# Patient Record
Sex: FEMALE | Race: ASIAN
[De-identification: names, ages, dates, MRNs, and addresses within clinical notes are randomized per-mention and may not be internally consistent; named-entity substitution may affect disease eponyms.]

---

## 2019-12-18 ENCOUNTER — APPOINTMENT (OUTPATIENT)
Dept: ORTHOPEDIC SURGERY | Facility: CLINIC | Age: 64
End: 2019-12-18

## 2020-10-08 ENCOUNTER — APPOINTMENT (OUTPATIENT)
Dept: ORTHOPEDIC SURGERY | Facility: CLINIC | Age: 65
End: 2020-10-08
Payer: COMMERCIAL

## 2020-10-08 PROBLEM — Z00.00 ENCOUNTER FOR PREVENTIVE HEALTH EXAMINATION: Status: ACTIVE | Noted: 2020-10-08

## 2020-10-08 PROCEDURE — 99204 OFFICE O/P NEW MOD 45 MIN: CPT | Mod: 25

## 2020-10-08 PROCEDURE — 20610 DRAIN/INJ JOINT/BURSA W/O US: CPT | Mod: RT

## 2020-10-08 PROCEDURE — 73562 X-RAY EXAM OF KNEE 3: CPT | Mod: RT

## 2020-10-08 NOTE — DISCUSSION/SUMMARY
[de-identified] : I discussed the underlying pathophysiology of the patient's condition in great detail with the patient. I went over the patient's x-rays with them in great detail. The extent of the patient’s arthritis was discussed in great detail with them. We discussed the use of ice, Tylenol and anti-inflammatories to relieve pain. She needs to avoid high-impact activities such as running and jumping. I recommend alternative activities such as riding a stationary bike on low tension, or the elliptical. She should avoid deep squatting and kneeling. Viscosupplementation was discussed as a solution to the patient's symptoms, but her insurance company does not cover these injections. Therefore, if she would like this treatment modality she would need to pay herself.\par The patient elected to receive a cortisone injection into her right knee today, and tolerated it well. I instructed the patient on ROM exercises, and told them to take it easy. The use of ice and rest was reviewed with the patient. The patient may resume activities tomorrow. If she does not have significant relief with this injection then a right knee MRI may be considered. If she does have relief then we will consider an  brace. All of her questions were answered. She understands and consents to the plan. \par \par FU 1 month.\par after a right knee cortisone injection today (10/08/2020).

## 2020-10-08 NOTE — HISTORY OF PRESENT ILLNESS
[de-identified] : 65 year old female presents complaining of right knee pain. She has been having intermittent pain for years that has become more frequent over the past few months. She denies any recent injuries or trauma. She saw Dr. Mc for this issue in February 2015. An MRI of her knee was done at that time that showed degenerative changes of the medial compartment and meniscal degradation. She has pain with standing and walking for long periods of time. Stairs are uncomfortable. 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 She has noticed some swelling but denies buckling. Her pain does wake her from sleep occasionally. She has not had recent treatment. The patient denies taking any medication to manage her pain.

## 2020-10-08 NOTE — ADDENDUM
[FreeTextEntry1] : I, Amadou Coulter, acted solely as a scribe for Dr. Bola Mc on this date 10/08/2020.\par All medical record entries made by the Scribe were at my, Dr. Bola Mc, direction and personally dictated by me on 10/08/2020. I have reviewed the chart and agree that the record accurately reflects my personal performance of the history, physical exam, assessment and plan. I have also personally directed, reviewed, and agreed with the chart.

## 2020-11-05 ENCOUNTER — APPOINTMENT (OUTPATIENT)
Dept: ORTHOPEDIC SURGERY | Facility: CLINIC | Age: 65
End: 2020-11-05
Payer: COMMERCIAL

## 2020-11-05 DIAGNOSIS — M23.91 UNSPECIFIED INTERNAL DERANGEMENT OF RIGHT KNEE: ICD-10-CM

## 2020-11-05 PROCEDURE — 99072 ADDL SUPL MATRL&STAF TM PHE: CPT

## 2020-11-05 PROCEDURE — 99214 OFFICE O/P EST MOD 30 MIN: CPT

## 2020-12-06 ENCOUNTER — RESULT REVIEW (OUTPATIENT)
Age: 65
End: 2020-12-06

## 2020-12-06 ENCOUNTER — APPOINTMENT (OUTPATIENT)
Dept: MRI IMAGING | Facility: IMAGING CENTER | Age: 65
End: 2020-12-06

## 2020-12-06 ENCOUNTER — OUTPATIENT (OUTPATIENT)
Dept: OUTPATIENT SERVICES | Facility: HOSPITAL | Age: 65
LOS: 1 days | End: 2020-12-06
Payer: COMMERCIAL

## 2020-12-06 DIAGNOSIS — M23.91 UNSPECIFIED INTERNAL DERANGEMENT OF RIGHT KNEE: ICD-10-CM

## 2020-12-06 DIAGNOSIS — M17.11 UNILATERAL PRIMARY OSTEOARTHRITIS, RIGHT KNEE: ICD-10-CM

## 2020-12-06 PROCEDURE — 73721 MRI JNT OF LWR EXTRE W/O DYE: CPT | Mod: 26,RT

## 2020-12-06 PROCEDURE — 73721 MRI JNT OF LWR EXTRE W/O DYE: CPT

## 2020-12-19 ENCOUNTER — TRANSCRIPTION ENCOUNTER (OUTPATIENT)
Age: 65
End: 2020-12-19

## 2020-12-21 ENCOUNTER — APPOINTMENT (OUTPATIENT)
Dept: ORTHOPEDIC SURGERY | Facility: CLINIC | Age: 65
End: 2020-12-21

## 2021-03-04 ENCOUNTER — RESULT REVIEW (OUTPATIENT)
Age: 66
End: 2021-03-04

## 2022-06-04 NOTE — PHYSICAL EXAM
Assumed care:  Marleen Samano is awake, alert and oriented x 3, skin warm and dry, in NAD.  Patient CO burning, pressure, and frequent urination.  States that she has been treated twice in the last month for UTIs.  States that she finished her medication both times.    Patient identifiers for Marleen Samano checked and correct.  LOC:  Marleen Samano is awake, alert, and aware of environment with an appropriate affect. She is oriented x 3 and speaking appropriately.  APPEARANCE:  She is resting comfortably and in no acute distress. She is clean and well groomed, patient's clothing is properly fastened.  SKIN:  The skin is warm and dry. She has normal skin turgor and moist mucus membranes. Skin is intact; no bruising or breakdown noted.  MUSCULOSKELETAL:  She is moving all extremities well, no obvious deformities noted. Pulses intact.   RESPIRATORY:  Airway is open and patent. Respirations are spontaneous and non-labored with normal effort and rate.  CARDIAC:  She has a normal rate and rhythm. No peripheral edema noted. Capillary refill < 3 seconds.  ABDOMEN:  No distention noted.  Soft and non-tender upon palpation.  NEUROLOGICAL:  PERRL. Facial expression is symmetrical. Hand grasps are equal bilaterally. Normal sensation in all extremities when touched with finger.  Allergies reported:    Review of patient's allergies indicates:   Allergen Reactions    Adhesive tape-silicones Rash     Blisters after on for several hours    Augmentin [amoxicillin-pot clavulanate]      thrush    Ciprofloxacin      thrush    Morphine Itching and Hives     OTHER NOTES:    
[de-identified] : Constitutional\par o Appearance : well-nourished, well developed, alert, in no acute distress \par Head and Face\par o Head :\par ¦ Inspection : atraumatic, normocephalic\par o Face :\par ¦ Inspection : no visible rash or discoloration\par Respiratory\par o Respiratory Effort: breathing unlabored \par Neurologic\par o Mental Status Examination :\par ¦ Orientation : grossly oriented to person, place and time\par Psychiatric\par o Mood and Affect: mood normal, affect appropriate \par \par Right Lower Extremity\par o Buttock : no tenderness, swelling or deformities \par o Right Hip :\par ¦ Inspection/Palpation : no tenderness, swelling or deformities\par ¦ Range of Motion : full and painless in all planes, no crepitance\par ¦ Stability : joint stability intact\par ¦ Strength : extension, flexion, adduction, abduction, internal rotation and external rotation 5/5 \par \par o Right Knee :\par ¦ Inspection/Palpation : medial compartment tenderness to palpation, mild swelling\par ¦ Range of Motion : active flexion and extension full, pain with full flexion, no crepitance\par ¦ Stability : mild valgus / varus instability present on provocative testing\par ¦ Strength : flexion and extension 5/5\par ¦ Tests and Signs : Anterior Drawer negative, Lachman negative, Rafael's negative\par \par Left Lower Extremity\par o Buttock : no tenderness, swelling or deformities \par o Left Hip :\par ¦ Inspection/Palpation : no tenderness, no swelling or deformities\par ¦ Range of Motion : full and painless in all planes, no crepitance\par ¦ Stability : joint stability intact\par ¦ Strength : extension, flexion, adduction, abduction, internal rotation and external rotation 5/5\par \par o Left Knee :\par ¦ Inspection/Palpation : no medial or lateral compartment tenderness to palpation, no swelling\par ¦ Range of Motion : active flexion and extension full and painless, no crepitance\par ¦ Stability : no valgus or varus instability present on provocative testing\par ¦ Strength : flexion and extension 5/5\par ¦ Tests and Signs : Anterior Drawer negative, Lachman negative, Rafael's negative\par \par Gait: normal gait, no significant extremity swelling or lymphedema, no foot drop, leg lengths equal\par \par Radiology Results:\par o Right Knee: Standing AP, lateral and tunnel views were obtained and reveal moderate medial and patellofemoral arthritis, slightly worse compared to films from January 2015.\par \par o Right Knee injection : Indication- knee osteoarthritis, Anatomic location- right intra-articular joint space, Spray - area was sterilized with Betadine and alcohol and anesthetized with Ethyl Chloride , needle used-20G, Medications given- 5cc's lidocaine, 0.5cc's kenalog, 0.5 cc's dexamethasone, and patient tolerated it well.

## 2022-08-11 ENCOUNTER — APPOINTMENT (OUTPATIENT)
Dept: ORTHOPEDIC SURGERY | Facility: CLINIC | Age: 67
End: 2022-08-11

## 2022-08-11 DIAGNOSIS — M17.11 UNILATERAL PRIMARY OSTEOARTHRITIS, RIGHT KNEE: ICD-10-CM

## 2022-08-11 PROCEDURE — 73564 X-RAY EXAM KNEE 4 OR MORE: CPT | Mod: RT

## 2022-08-11 PROCEDURE — 99214 OFFICE O/P EST MOD 30 MIN: CPT

## 2022-08-11 NOTE — HISTORY OF PRESENT ILLNESS
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 She likes hiking and uses two hiking poles. She notes pain after hiking for 2-3 hours or standing on her feet for long periods of time at work. She feels some swelling posteriorly .She denies buckling or giving out. She feels a tightness when she lifts her leg. She was last seen for her knee in November 2020. She had a cortisone injection that gave her no relief so we sent her for an MRI. She did the MRI then was lost to follow-up. She is concerned about the Baker's cyst.\par \par Right Knee MRI obtained at [facility] on 12/06/2020 revealed:\par 1.  Intrasubstance degeneration of the body and anterior horn of the medial meniscus without fluid-filled tear. Moderate to high-grade chondral loss in the medial compartment.\par 2.  Moderate chondral loss within the patella.\par 3.  Small joint effusion and synovitis. Small Baker's cyst. Semimembranosus bursitis.

## 2022-08-11 NOTE — ADDENDUM
[FreeTextEntry1] : I, Amadou Coulter, acted solely as a scribe for Dr. Bloa Mc on this date 08/11/2022.\par All medical record entries made by the Scribe were at my, Dr. Bola Mc, direction and personally dictated by me on 08/11/2022. I have reviewed the chart and agree that the record accurately reflects my personal performance of the history, physical exam, assessment and plan. I have also personally directed, reviewed, and agreed with the chart.

## 2022-08-11 NOTE — PHYSICAL EXAM
[de-identified] : Constitutional\par o Appearance : well-nourished, well developed, alert, in no acute distress \par Head and Face\par o Head :\par ¦ Inspection : atraumatic, normocephalic\par o Face :\par ¦ Inspection : no visible rash or discoloration\par Respiratory\par o Respiratory Effort: breathing unlabored \par Neurologic\par o Mental Status Examination :\par ¦ Orientation : grossly oriented to person, place and time\par Psychiatric\par o Mood and Affect: mood normal, affect appropriate \par \par Right Lower Extremity\par o Buttock : no tenderness, swelling or deformities \par o Right Hip :\par ¦ Inspection/Palpation : no tenderness, swelling or deformities\par ¦ Range of Motion : full flexion and rotation, no crepitance\par ¦ Stability : joint stability intact\par ¦ Strength : extension, flexion, adduction, abduction, internal rotation and external rotation, 4+/5 \par \par o Right Knee :\par ¦ Inspection/Palpation : medial femoral condylar and compartment tenderness, no lateral compartment tenderness, no swelling, moderate Baker's cyst, mild varus deformity\par ¦ Range of Motion : FROM but pain with full flexion, no crepitance, slightly decreased patellofemoral glide \par ¦ Stability : mild valgus / varus instability present on provocative testing\par ¦ Strength : flexion and extension 4+/5\par ¦ Tests and Signs : Anterior Drawer negative, Lachman negative, Rafael's positive \par \par Left Lower Extremity\par o Buttock : no tenderness, swelling or deformities \par o Left Hip :\par ¦ Inspection/Palpation : no tenderness, no swelling or deformities\par ¦ Range of Motion : full and painless in all planes, no crepitance\par ¦ Stability : joint stability intact\par ¦ Strength : extension, flexion, adduction, abduction, internal rotation and external rotation, 4+/5\par \par o Left Knee :\par ¦ Inspection/Palpation : no medial or lateral compartment tenderness to palpation, no swelling, small Baker's cyst\par ¦ Range of Motion : active flexion and extension full and painless, no crepitance, good patellofemoral glide \par ¦ Stability : no valgus or varus instability present on provocative testing\par ¦ Strength : flexion and extension 4+/5\par ¦ Tests and Signs : Anterior Drawer negative, Lachman negative, Rafael's negative\par \par Gait: varus deformity on the right with varus thrust, no significant extremity swelling or lymphedema, no foot drop, leg lengths equal\par \par Radiology Results\par o Right Knee : Standing AP, lateral, tunnel, and skyline views of the knee were obtained and reveal moderate medial and patellofemoral arthritis, mildly increased from 10/2020.

## 2022-08-11 NOTE — DISCUSSION/SUMMARY
[de-identified] : I went over the pathophysiology of the patient's symptoms in great detail with the patient. I went over the patient's x-rays and MRI with them in great detail. The extent of the patient’s arthritis was discussed in great detail with them. We discussed the use of ice, Tylenol and anti-inflammatories to relieve pain. I advised her to ice her knee after hiking and to use hiking sticks. Viscosupplementation was discussed as a solution to the patient's symptoms and a booklet with information was provided. But, her insurance company does not cover these injections. Therefore, if she would like this treatment modality she would need to pay herself. We offered her a cortisone injection but she does not think it is needed. If she is concerned about the Baker's cyst and it becomes bigger she can return for a cortisone injection. I would like to see the patient back in the office on a PRN basis to reassess her condition. All of their questions were answered. They understand and consent to the plan.\par \par FU PRN after considering injections.

## 2023-05-31 ENCOUNTER — APPOINTMENT (OUTPATIENT)
Dept: ORTHOPEDIC SURGERY | Facility: CLINIC | Age: 68
End: 2023-05-31
Payer: COMMERCIAL

## 2023-05-31 VITALS — HEIGHT: 62 IN | WEIGHT: 124 LBS | BODY MASS INDEX: 22.82 KG/M2

## 2023-05-31 DIAGNOSIS — S32.030A WEDGE COMPRESSION FRACTURE OF THIRD LUMBAR VERTEBRA, INITIAL ENCOUNTER FOR CLOSED FRACTURE: ICD-10-CM

## 2023-05-31 DIAGNOSIS — M47.816 SPONDYLOSIS W/OUT MYELOPATHY OR RADICULOPATHY, LUMBAR REGION: ICD-10-CM

## 2023-05-31 PROCEDURE — 99214 OFFICE O/P EST MOD 30 MIN: CPT

## 2023-06-05 ENCOUNTER — NON-APPOINTMENT (OUTPATIENT)
Age: 68
End: 2023-06-05

## 2023-06-21 ENCOUNTER — NON-APPOINTMENT (OUTPATIENT)
Age: 68
End: 2023-06-21

## 2024-03-05 ENCOUNTER — APPOINTMENT (OUTPATIENT)
Dept: ORTHOPEDIC SURGERY | Facility: CLINIC | Age: 69
End: 2024-03-05
Payer: COMMERCIAL

## 2024-03-05 DIAGNOSIS — M17.0 BILATERAL PRIMARY OSTEOARTHRITIS OF KNEE: ICD-10-CM

## 2024-03-05 DIAGNOSIS — M16.0 BILATERAL PRIMARY OSTEOARTHRITIS OF HIP: ICD-10-CM

## 2024-03-05 DIAGNOSIS — S76.312A STRAIN OF MUSCLE, FASCIA AND TENDON OF THE POSTERIOR MUSCLE GROUP AT THIGH LEVEL, LEFT THIGH, INITIAL ENCOUNTER: ICD-10-CM

## 2024-03-05 PROCEDURE — 99214 OFFICE O/P EST MOD 30 MIN: CPT

## 2024-03-05 PROCEDURE — 73564 X-RAY EXAM KNEE 4 OR MORE: CPT | Mod: 50

## 2024-03-05 NOTE — PHYSICAL EXAM
Client will return Sept 8 at 10am.  She will work on practicing kind and calm self-talk to reduce pressures, criticism, and anxiety, especially related to work.  Client to attend psychiatry appointment in Sept.    [de-identified] : Constitutional o Appearance : well-nourished, well developed, alert, in no acute distress  Head and Face o Head :  Inspection : atraumatic, normocephalic o Face :  Inspection : no visible rash or discoloration Respiratory o Respiratory Effort: breathing unlabored  Neurologic o Mental Status Examination :  Orientation : alert and oriented X 3 Psychiatric o Mood and Affect: mood normal, affect appropriate Cardiovascular o Observation/Palpation : - no swelling Lymphatic o Additional Nodes : No palpable lymph nodes present  Right Lower Extremity o Buttock : no tenderness, swelling or deformities  o Right Hip :  Inspection/Palpation : no tenderness, swelling or deformities  Range of Motion : full and slight limited rotation, no crepitance  Stability : joint stability intact  Strength : extension, flexion, adduction, abduction, internal rotation and external rotation 4-/5   o Right Knee :  Inspection/Palpation : medial compartment tenderness  tenderness to palpation, no swelling  Range of Motion :  0-140 pain with active flexion and extension,  mild patellafemoral crepitance  Stability : no valgus or varus instability present on provocative testing  Strength : flexion and extension 4-/5  Tests and Signs : negative Anterior Drawer, negative Lachman, negative Rafael  Left Lower Extremity o Buttock : no tenderness, swelling or deformities  o Left Hip :  Inspection/Palpation : no tenderness, no swelling or deformities  Range of Motion : full and painless in all planes, no crepitance  Stability : joint stability intact  Strength : extension, flexion, adduction, abduction, internal rotation and external rotation 4-/5  o Left Knee :  Inspection/Palpation : no tenderness to palpation, mild swelling  Range of Motion : 0-140 active flexion and extension full and painless, no crepitance  Stability : no valgus or varus instability present on provocative testing  Strength : flexion and extension 4-/5, hamstring 4-/5  Tests and Signs : negative Anterior Drawer, negative Lachman, negative Rafael  Gait and Station: Gait: gait normal, no significant extremity swelling or lymphedema, good proprioception and balance  Radiology Results 3/05/2024  o Right Knee : Standing AP, lateral, tunnel, and skyline views of the knee were obtained and reveal bone on bone in the medial compartment with moderate patellofemoral arthritis  o Left Knee : Standing AP, lateral, tunnel, and skyline views of the knee were obtained and reveal moderate medial and patellofemoral arthritis

## 2024-03-05 NOTE — DISCUSSION/SUMMARY
[de-identified] : I went over the pathophysiology of the patient's symptoms in great detail with the patient. I discussed the underlying pathophysiology of the patient's condition in great detail with the patient. I went over the patient's x-rays with them in great detail. At this time, she will start a course of physical therapy for strengthening and flexibility. A prescription was provided. We discussed the use of ice, Tylenol and anti-inflammatories to relieve pain.   All of their questions were answered. They understand and consent to the plan.   FU in 6 weeks. We will consider a left knee cortisone injection upon her return if she does not improve.

## 2024-03-05 NOTE — HISTORY OF PRESENT ILLNESS
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 She states the left knee is worse than the right. She denies any swelling or buckling of the right knee. She states tightness of the back of her left knee. She states she stretches every morning. She ambulates independently. She states she feels swelling inside of the left knee. She states she has constant right knee pain. She states she walks and hikes as a form of exercise. She states she uses 3 different kinds of braces for her knees.  AP and lateral views of the lumbosacral spine done at [facility] on 05/28/23 were reviewed and reveal a compression fracture of L3 with degenerative disc disease at L5/S1.

## 2024-04-16 ENCOUNTER — APPOINTMENT (OUTPATIENT)
Dept: ORTHOPEDIC SURGERY | Facility: CLINIC | Age: 69
End: 2024-04-16

## 2025-06-07 ENCOUNTER — NON-APPOINTMENT (OUTPATIENT)
Age: 70
End: 2025-06-07

## 2025-06-09 ENCOUNTER — APPOINTMENT (OUTPATIENT)
Dept: ORTHOPEDIC SURGERY | Facility: CLINIC | Age: 70
End: 2025-06-09
Payer: MEDICARE

## 2025-06-09 PROCEDURE — 20610 DRAIN/INJ JOINT/BURSA W/O US: CPT | Mod: RT

## 2025-06-09 PROCEDURE — 99204 OFFICE O/P NEW MOD 45 MIN: CPT | Mod: 25

## 2025-06-09 PROCEDURE — 73562 X-RAY EXAM OF KNEE 3: CPT | Mod: 50

## 2025-07-12 NOTE — ADDENDUM
Need for Home Care Services was noted via Epic.    Spoke with pt regarding need for home care. Patient is agreeable to home care services of [facility] .     Homebound criteria was discussed in detail and pt verbalizes understanding. Patient meets homebound criteria as noted in referral.     Information provided to pt along with [facility] contact information. Pt understands that home care is an intermittent level of care and visits may not occur daily. Pt is teachable and willing to learn Pt has support of son. Teach back demonstrated.     Address, phone number, email, and insurance verified with pt as per [employer] records. Best contact number is [phone] .     Patient does not have any special communication needs. Those special communication needs are NA.    "[employer] Al Status: Is the "[employer] al visible on the Storyboard yes (Yes/No). Video visit capable yes (Yes/No).     ([place] Only) Inpatient Palliative Care Order/Consult: no (Yes/No)    Active with a skilled home health agency prior to admission? no (Yes/No)  Does the patient have any unskilled agencies in the home, such as caregivers, cleaning services, meal assistance, private nurse? no (Yes/No)    Verified that following provider is [facility]  and willing to follow for home care.     Any scheduling requests: none    Presence of bedbugs and/or infestation of any bugs in the home: no  Provided patient with the following 1st visit reminders:   Have insurance card, medications, and vitamins and OTCs available to review with    Any discharge paperwork for the  to review available   Please write down any questions you have, to review with nurse/therapist during the first visit   For safety, we ask that any pets or firearms are secured in the home prior to nurse/therapist visiting. If pets in the home, please list here for field teammate awareness in event of allergies: none    Liaison will continue to follow until  [FreeTextEntry1] : I, MALCOLM ROSA, acted solely as a scribe for Dr. Bola Mc on this date 03/05/2024.  All medical record entries made by the Scribe were at my, Dr. Bola Mc, direction and personally dictated by me on 03/05/2024. I have reviewed the chart and agree that the record accurately reflects my personal performance of the history, physical exam, assessment and plan. I have also personally directed, reviewed, and agreed with the chart.								  discharge.   Anticipated dc date is 7/12/25.  Prachi Orourke RN  [facility]   Home Care Services Liaison  [phone]

## 2025-07-17 ENCOUNTER — APPOINTMENT (OUTPATIENT)
Dept: ORTHOPEDIC SURGERY | Facility: CLINIC | Age: 70
End: 2025-07-17

## 2025-08-13 ENCOUNTER — APPOINTMENT (OUTPATIENT)
Dept: ORTHOPEDIC SURGERY | Facility: CLINIC | Age: 70
End: 2025-08-13
Payer: MEDICARE

## 2025-08-13 PROCEDURE — 99213 OFFICE O/P EST LOW 20 MIN: CPT
